# Patient Record
Sex: FEMALE | Race: WHITE | Employment: FULL TIME | URBAN - METROPOLITAN AREA
[De-identification: names, ages, dates, MRNs, and addresses within clinical notes are randomized per-mention and may not be internally consistent; named-entity substitution may affect disease eponyms.]

---

## 2024-02-04 ENCOUNTER — OFFICE VISIT (OUTPATIENT)
Dept: FAMILY MEDICINE | Facility: CLINIC | Age: 29
End: 2024-02-04
Payer: COMMERCIAL

## 2024-02-04 VITALS
DIASTOLIC BLOOD PRESSURE: 80 MMHG | OXYGEN SATURATION: 98 % | TEMPERATURE: 98.5 F | SYSTOLIC BLOOD PRESSURE: 115 MMHG | RESPIRATION RATE: 12 BRPM | HEART RATE: 98 BPM

## 2024-02-04 DIAGNOSIS — H66.91 RIGHT ACUTE OTITIS MEDIA: Primary | ICD-10-CM

## 2024-02-04 PROCEDURE — 99203 OFFICE O/P NEW LOW 30 MIN: CPT | Performed by: STUDENT IN AN ORGANIZED HEALTH CARE EDUCATION/TRAINING PROGRAM

## 2024-02-04 RX ORDER — LEVOTHYROXINE SODIUM 50 UG/1
TABLET ORAL
COMMUNITY
Start: 2023-12-06

## 2024-02-05 NOTE — PROGRESS NOTES
ASSESSMENT & PLAN:   Diagnoses and all orders for this visit:  Right acute otitis media  -     amoxicillin-clavulanate (AUGMENTIN) 875-125 MG tablet; Take 1 tablet by mouth 2 times daily for 7 days    Right ear pain x 2 days. Has right AOM on exam. Will treat with Augmentin x 7 days (treated for strep with amoxicillin 2 weeks ago). Continue Flonase.     At the end of the encounter, I discussed results, diagnosis, medications. Discussed red flags for immediate return to clinic/ER, as well as indications for follow up if no improvement. Patient and/or caregiver understood and agreed to plan. Patient was stable for discharge.    There are no Patient Instructions on file for this visit.    ------------------------------------------------------------------------  SUBJECTIVE  History was obtained from patient.    Patient presents with:  Ear Problem: Right Ear Pain. Symptoms started Friday.    HPI  Vanessa Wang is a(n) 28 year old female presenting to urgent care for right ear pressure x 2 days. Ear feels clogged. Had strep 2 weeks ago - treated with amoxicillin. Then had bronchitis - treated with inhaler and tessalon. Using Flonase and Claritin with no relief.    Review of Systems    Current Outpatient Medications   Medication Sig Dispense Refill    amoxicillin-clavulanate (AUGMENTIN) 875-125 MG tablet Take 1 tablet by mouth 2 times daily for 7 days 14 tablet 0    citalopram (CELEXA) 10 MG tablet Take 10 mg by mouth daily.      levothyroxine (SYNTHROID/LEVOTHROID) 50 MCG tablet TAKE 5 TABLETS(250 MCG) BY MOUTH DAILY      levonorgestrel-ethinyl estradiol (AVIANE,ALESSE,LESSINA) 0.1-20 MG-MCG per tablet Take 1 tablet by mouth daily. (Patient not taking: Reported on 2/4/2024) 3 Package 3    medroxyPROGESTERone (DEPO-PROVERA) 150 MG/ML injection Inject 150 mg into the muscle every 3 months. (Patient not taking: Reported on 2/4/2024)       Problem List:  2013-04: Low back pain  2013-04: Sacroiliac pain  2011-12:  Dysmenorrhea    No Known Allergies      OBJECTIVE  Vitals:    02/04/24 1806   BP: 115/80   Pulse: 98   Resp: 12   Temp: 98.5  F (36.9  C)   TempSrc: Tympanic   SpO2: 98%     Physical Exam   GENERAL: healthy, alert, no acute distress.   PSYCH: mentation appears normal. Normal affect  HEAD: normocephalic, atraumatic.  EYE: PERRL. EOMs intact. No scleral injection bilaterally.   EAR: external ear normal. Bilateral ear canals normal and nonpainful. Right TM bulging and erythematous. Left TM intact, pearly, translucent without bulging.  NOSE: external nose atraumatic without lesions.  LUNGS: no increased work of breathing.     No results found for any visits on 02/04/24.

## 2024-02-17 ENCOUNTER — HEALTH MAINTENANCE LETTER (OUTPATIENT)
Age: 29
End: 2024-02-17